# Patient Record
Sex: MALE | Race: WHITE
[De-identification: names, ages, dates, MRNs, and addresses within clinical notes are randomized per-mention and may not be internally consistent; named-entity substitution may affect disease eponyms.]

---

## 2020-07-02 ENCOUNTER — HOSPITAL ENCOUNTER (EMERGENCY)
Dept: HOSPITAL 9 - MADERS | Age: 75
LOS: 1 days | Discharge: ANOTHER HOSPITAL | End: 2020-07-03
Payer: MEDICARE

## 2020-07-02 DIAGNOSIS — I10: ICD-10-CM

## 2020-07-02 DIAGNOSIS — Z79.899: ICD-10-CM

## 2020-07-02 DIAGNOSIS — J18.9: Primary | ICD-10-CM

## 2020-07-02 LAB
ALBUMIN SERPL BCG-MCNC: 3.9 G/DL (ref 3.4–4.8)
ALP SERPL-CCNC: 86 U/L (ref 40–110)
ALT SERPL W P-5'-P-CCNC: 41 U/L (ref 8–55)
ANION GAP SERPL CALC-SCNC: 19 MMOL/L (ref 10–20)
AST SERPL-CCNC: 42 U/L (ref 5–34)
BILIRUB SERPL-MCNC: 1.2 MG/DL (ref 0.2–1.2)
BUN SERPL-MCNC: 23 MG/DL (ref 8.4–25.7)
CALCIUM SERPL-MCNC: 8.9 MG/DL (ref 7.8–10.44)
CHLORIDE SERPL-SCNC: 96 MMOL/L (ref 98–107)
CK MB SERPL-MCNC: 1.5 NG/ML (ref 0–6.6)
CO2 SERPL-SCNC: 23 MMOL/L (ref 23–31)
CREAT CL PREDICTED SERPL C-G-VRATE: 0 ML/MIN (ref 70–130)
GLOBULIN SER CALC-MCNC: 3.8 G/DL (ref 2.4–3.5)
GLUCOSE SERPL-MCNC: 117 MG/DL (ref 83–110)
HGB BLD-MCNC: 14.7 G/DL (ref 14–18)
MCH RBC QN AUTO: 28.6 PG (ref 27–31)
MCV RBC AUTO: 90.8 FL (ref 78–98)
MDIFF COMPLETE?: YES
PLATELET # BLD AUTO: 185 THOU/UL (ref 130–400)
POTASSIUM SERPL-SCNC: 3.9 MMOL/L (ref 3.5–5.1)
RBC # BLD AUTO: 5.13 MILL/UL (ref 4.7–6.1)
SODIUM SERPL-SCNC: 134 MMOL/L (ref 136–145)
WBC # BLD AUTO: 10.7 THOU/UL (ref 4.8–10.8)

## 2020-07-02 PROCEDURE — 87040 BLOOD CULTURE FOR BACTERIA: CPT

## 2020-07-02 PROCEDURE — 85025 COMPLETE CBC W/AUTO DIFF WBC: CPT

## 2020-07-02 PROCEDURE — 83880 ASSAY OF NATRIURETIC PEPTIDE: CPT

## 2020-07-02 PROCEDURE — 84484 ASSAY OF TROPONIN QUANT: CPT

## 2020-07-02 PROCEDURE — 96365 THER/PROPH/DIAG IV INF INIT: CPT

## 2020-07-02 PROCEDURE — 94760 N-INVAS EAR/PLS OXIMETRY 1: CPT

## 2020-07-02 PROCEDURE — 80053 COMPREHEN METABOLIC PANEL: CPT

## 2020-07-02 PROCEDURE — 83605 ASSAY OF LACTIC ACID: CPT

## 2020-07-02 PROCEDURE — 71275 CT ANGIOGRAPHY CHEST: CPT

## 2020-07-02 PROCEDURE — 81003 URINALYSIS AUTO W/O SCOPE: CPT

## 2020-07-02 PROCEDURE — 96375 TX/PRO/DX INJ NEW DRUG ADDON: CPT

## 2020-07-02 PROCEDURE — 93005 ELECTROCARDIOGRAM TRACING: CPT

## 2020-07-02 PROCEDURE — 82553 CREATINE MB FRACTION: CPT

## 2020-07-03 ENCOUNTER — HOSPITAL ENCOUNTER (INPATIENT)
Dept: HOSPITAL 92 - ERS | Age: 75
LOS: 7 days | Discharge: HOME HEALTH | DRG: 299 | End: 2020-07-10
Attending: INTERNAL MEDICINE | Admitting: INTERNAL MEDICINE
Payer: MEDICARE

## 2020-07-03 VITALS — BODY MASS INDEX: 42.7 KG/M2

## 2020-07-03 DIAGNOSIS — I10: ICD-10-CM

## 2020-07-03 DIAGNOSIS — I87.2: ICD-10-CM

## 2020-07-03 DIAGNOSIS — Z90.49: ICD-10-CM

## 2020-07-03 DIAGNOSIS — I27.81: ICD-10-CM

## 2020-07-03 DIAGNOSIS — G47.33: ICD-10-CM

## 2020-07-03 DIAGNOSIS — I89.0: ICD-10-CM

## 2020-07-03 DIAGNOSIS — I26.99: ICD-10-CM

## 2020-07-03 DIAGNOSIS — Z91.19: ICD-10-CM

## 2020-07-03 DIAGNOSIS — I24.8: ICD-10-CM

## 2020-07-03 DIAGNOSIS — G25.81: ICD-10-CM

## 2020-07-03 DIAGNOSIS — I28.8: ICD-10-CM

## 2020-07-03 DIAGNOSIS — I82.411: Primary | ICD-10-CM

## 2020-07-03 DIAGNOSIS — E87.70: ICD-10-CM

## 2020-07-03 DIAGNOSIS — Z20.828: ICD-10-CM

## 2020-07-03 LAB
ALBUMIN SERPL BCG-MCNC: 3.7 G/DL (ref 3.4–4.8)
ALP SERPL-CCNC: 82 U/L (ref 40–110)
ALT SERPL W P-5'-P-CCNC: 36 U/L (ref 8–55)
ANION GAP SERPL CALC-SCNC: 13 MMOL/L (ref 10–20)
ANION GAP SERPL CALC-SCNC: 13 MMOL/L (ref 10–20)
AST SERPL-CCNC: 36 U/L (ref 5–34)
BASOPHILS # BLD AUTO: 0 THOU/UL (ref 0–0.2)
BASOPHILS NFR BLD AUTO: 0.3 % (ref 0–1)
BILIRUB SERPL-MCNC: 1.4 MG/DL (ref 0.2–1.2)
BUN SERPL-MCNC: 19 MG/DL (ref 8.4–25.7)
BUN SERPL-MCNC: 20 MG/DL (ref 8.4–25.7)
CALCIUM SERPL-MCNC: 8.5 MG/DL (ref 7.8–10.44)
CALCIUM SERPL-MCNC: 9.1 MG/DL (ref 7.8–10.44)
CHLORIDE SERPL-SCNC: 96 MMOL/L (ref 98–107)
CHLORIDE SERPL-SCNC: 96 MMOL/L (ref 98–107)
CO2 SERPL-SCNC: 27 MMOL/L (ref 23–31)
CO2 SERPL-SCNC: 27 MMOL/L (ref 23–31)
CREAT CL PREDICTED SERPL C-G-VRATE: 124 ML/MIN (ref 70–130)
CREAT CL PREDICTED SERPL C-G-VRATE: 126 ML/MIN (ref 70–130)
EOSINOPHIL # BLD AUTO: 0 THOU/UL (ref 0–0.7)
EOSINOPHIL NFR BLD AUTO: 0.4 % (ref 0–10)
GLOBULIN SER CALC-MCNC: 2.9 G/DL (ref 2.4–3.5)
GLUCOSE SERPL-MCNC: 103 MG/DL (ref 83–110)
GLUCOSE SERPL-MCNC: 113 MG/DL (ref 83–110)
HGB BLD-MCNC: 14.5 G/DL (ref 14–18)
HGB BLD-MCNC: 14.5 G/DL (ref 14–18)
LYMPHOCYTES # BLD: 1.6 THOU/UL (ref 1.2–3.4)
LYMPHOCYTES NFR BLD AUTO: 17.6 % (ref 21–51)
MCH RBC QN AUTO: 30.6 PG (ref 27–31)
MCH RBC QN AUTO: 31.5 PG (ref 27–31)
MCV RBC AUTO: 90.7 FL (ref 78–98)
MCV RBC AUTO: 91.5 FL (ref 78–98)
MDIFF COMPLETE?: YES
MONOCYTES # BLD AUTO: 1.4 THOU/UL (ref 0.11–0.59)
MONOCYTES NFR BLD AUTO: 14.7 % (ref 0–10)
NEUTROPHILS # BLD AUTO: 6.1 THOU/UL (ref 1.4–6.5)
NEUTROPHILS NFR BLD AUTO: 67 % (ref 42–75)
PLATELET # BLD AUTO: 138 THOU/UL (ref 130–400)
PLATELET # BLD AUTO: 149 THOU/UL (ref 130–400)
POTASSIUM SERPL-SCNC: 3.4 MMOL/L (ref 3.5–5.1)
POTASSIUM SERPL-SCNC: 3.5 MMOL/L (ref 3.5–5.1)
RBC # BLD AUTO: 4.61 MILL/UL (ref 4.7–6.1)
RBC # BLD AUTO: 4.75 MILL/UL (ref 4.7–6.1)
SODIUM SERPL-SCNC: 132 MMOL/L (ref 136–145)
SODIUM SERPL-SCNC: 133 MMOL/L (ref 136–145)
TROPONIN I SERPL DL<=0.01 NG/ML-MCNC: 0.05 NG/ML (ref ?–0.03)
TROPONIN I SERPL DL<=0.01 NG/ML-MCNC: 0.05 NG/ML (ref ?–0.03)
WBC # BLD AUTO: 9.2 THOU/UL (ref 4.8–10.8)
WBC # BLD AUTO: 9.2 THOU/UL (ref 4.8–10.8)

## 2020-07-03 PROCEDURE — 94660 CPAP INITIATION&MGMT: CPT

## 2020-07-03 PROCEDURE — 85025 COMPLETE CBC W/AUTO DIFF WBC: CPT

## 2020-07-03 PROCEDURE — 96372 THER/PROPH/DIAG INJ SC/IM: CPT

## 2020-07-03 PROCEDURE — 87635 SARS-COV-2 COVID-19 AMP PRB: CPT

## 2020-07-03 PROCEDURE — 87040 BLOOD CULTURE FOR BACTERIA: CPT

## 2020-07-03 PROCEDURE — 85027 COMPLETE CBC AUTOMATED: CPT

## 2020-07-03 PROCEDURE — 82550 ASSAY OF CK (CPK): CPT

## 2020-07-03 PROCEDURE — 80048 BASIC METABOLIC PNL TOTAL CA: CPT

## 2020-07-03 PROCEDURE — 93005 ELECTROCARDIOGRAM TRACING: CPT

## 2020-07-03 PROCEDURE — 84484 ASSAY OF TROPONIN QUANT: CPT

## 2020-07-03 PROCEDURE — G0378 HOSPITAL OBSERVATION PER HR: HCPCS

## 2020-07-03 PROCEDURE — 93010 ELECTROCARDIOGRAM REPORT: CPT

## 2020-07-03 PROCEDURE — 82805 BLOOD GASES W/O2 SATURATION: CPT

## 2020-07-03 PROCEDURE — 36415 COLL VENOUS BLD VENIPUNCTURE: CPT

## 2020-07-03 PROCEDURE — U0003 INFECTIOUS AGENT DETECTION BY NUCLEIC ACID (DNA OR RNA); SEVERE ACUTE RESPIRATORY SYNDROME CORONAVIRUS 2 (SARS-COV-2) (CORONAVIRUS DISEASE [COVID-19]), AMPLIFIED PROBE TECHNIQUE, MAKING USE OF HIGH THROUGHPUT TECHNOLOGIES AS DESCRIBED BY CMS-2020-01-R: HCPCS

## 2020-07-03 PROCEDURE — 80053 COMPREHEN METABOLIC PANEL: CPT

## 2020-07-03 PROCEDURE — 87205 SMEAR GRAM STAIN: CPT

## 2020-07-03 PROCEDURE — 87633 RESP VIRUS 12-25 TARGETS: CPT

## 2020-07-03 PROCEDURE — 82553 CREATINE MB FRACTION: CPT

## 2020-07-03 PROCEDURE — 85007 BL SMEAR W/DIFF WBC COUNT: CPT

## 2020-07-03 PROCEDURE — 99285 EMERGENCY DEPT VISIT HI MDM: CPT

## 2020-07-03 PROCEDURE — 87070 CULTURE OTHR SPECIMN AEROBIC: CPT

## 2020-07-03 PROCEDURE — 80202 ASSAY OF VANCOMYCIN: CPT

## 2020-07-03 PROCEDURE — 84145 PROCALCITONIN (PCT): CPT

## 2020-07-03 PROCEDURE — 85018 HEMOGLOBIN: CPT

## 2020-07-03 PROCEDURE — 93970 EXTREMITY STUDY: CPT

## 2020-07-03 PROCEDURE — 71045 X-RAY EXAM CHEST 1 VIEW: CPT

## 2020-07-03 PROCEDURE — 93306 TTE W/DOPPLER COMPLETE: CPT

## 2020-07-03 PROCEDURE — 83735 ASSAY OF MAGNESIUM: CPT

## 2020-07-03 PROCEDURE — 81001 URINALYSIS AUTO W/SCOPE: CPT

## 2020-07-03 PROCEDURE — 85014 HEMATOCRIT: CPT

## 2020-07-03 RX ADMIN — CEFTRIAXONE SCH MLS: 1 INJECTION, POWDER, FOR SOLUTION INTRAMUSCULAR; INTRAVENOUS at 20:17

## 2020-07-03 RX ADMIN — ONDANSETRON PRN MG: 2 INJECTION INTRAMUSCULAR; INTRAVENOUS at 10:42

## 2020-07-03 RX ADMIN — AZITHROMYCIN SCH MLS: 500 INJECTION, POWDER, LYOPHILIZED, FOR SOLUTION INTRAVENOUS at 20:16

## 2020-07-03 NOTE — CT
CTA Angio Chest W WO Con



History: Dyspnea fever and chills



Comparison: None.



Findings: CT angiogram of the chest performed after the intravenous administration of contrast. 3-D r
endering provided.



No proximal segmental pulmonary arterial filling defect. Extensive motion artifact.



Heart size mildly enlarged. Small paratracheal lymph nodes.



Limited evaluation of the upper abdomen is relatively unremarkable.



Mild atelectasis within the lung bases. Subtle focus of consolidation anterior segment left upper lob
e. Sternum and manubrium are intact. Thoracic spine is intact.



No acute displaced rib fracture.



Impression: 

1. No pulmonary embolism.

2. Subtle focus of consolidation anterior segment left upper lobe suggesting pneumonia..

3. Mild cardiomegaly.



Reported By: Bradley Bey 

Electronically Signed:  7/3/2020 12:00 AM

## 2020-07-03 NOTE — PDOC.HHP
Hospitalist HPI





- History of Present Illness


fever cough dyspnea


History of Present Illness: 


Case of an 75y/o male with pmhx of htn restless leg syndrome and lypmhedema who 

comes to hospital due to cough dyspnea and fever. patient refer he was on his 

usual state of health until 1week ago when he started with dyspnea cough non 

productive and fever. he stated he visited [facility] and got tested for covid 

but was told results will take atleast a week. symptoms kept progressing so 

patient decided to come for evaluation. at the ed patient was dx with pneumonia 

by ct and elevated troponins for which hospitalist was called for further 

evaluation and mangement. patient denies any chest pain palpitations or 

diaphoresis


 





Hospitalist ROS





- Review of Systems


All other systems reviewed; all pertinent +/- noted in HPI/Subj





Hospitalist History





- Past Medical History


Source: patient, old records


Cardiac: reports: HTN





- Past Surgical History


Past Surgical History: reports: Appendectomy





- Family History


Family History: reports: no pertinent history





- Social History


Smoking Status: Never smoker


Alcohol: reports: Occassional


Drugs: reports: none


Living Situation: With Family





- Exam


General Appearance: NAD, awake alert


Eye: PERRL, anicteric sclera


ENT: normocephalic atraumatic, no oropharyngeal lesions


Neck: supple, symmetric, no JVD, no thyromegaly


Heart: RRR, no murmur, no gallops, no rubs


Respiratory: CTAB, no wheezes, no rales, no ronchi


Gastrointestinal: soft, non-tender, non-distended, normal bowel sounds


Extremities - other findings: b/l LE lymphedema


Neurological: cranial nerve grossly intact, normal sensation to touch


Musculoskeletal: normal tone, normal strength, no muscle wasting


Psychiatric: normal affect, normal behavior, A&O x 3





Hospitalist Results





- Radiology Interpretation


  ** CT scan - chest


Status: report reviewed by me


Additional Comment: 





Impression: 


1. No pulmonary embolism.


2. Subtle focus of consolidation anterior segment left upper lobe suggesting 

pneumonia..


3. Mild cardiomegaly.








Hospitalist H&P A/P





- Problem


(1) Pneumonia


Code(s): J18.9 - PNEUMONIA, UNSPECIFIED ORGANISM   Status: Acute   





(2) HTN (hypertension)


Code(s): I10 - ESSENTIAL (PRIMARY) HYPERTENSION   Status: Acute   





(3) Lymphedema


Code(s): I89.0 - LYMPHEDEMA, NOT ELSEWHERE CLASSIFIED   Status: Acute   





(4) Restless leg syndrome


Status: Acute   





(5) Elevated troponin


Code(s): R79.89 - OTHER SPECIFIED ABNORMAL FINDINGS OF BLOOD CHEMISTRY   Status

: Acute   





(6) COVID-19


Code(s): U07.1 - COVID-19   Status: Acute   





- Plan


Plan: 


pneumonia


- started on rocephin and azithromycin


- f/u blood + sputum cultures


-o2 supplementation


- no leukocytosis but bandemia present


- LA 1.2


- cta consistent with left upper lobe pnuemonia





covid 19


- tested at the ed, pending results


- 02 supplemenation


- islolation protocol





htn / restless leg syndrome


- continue home meds

## 2020-07-04 RX ADMIN — AZITHROMYCIN SCH MLS: 500 INJECTION, POWDER, LYOPHILIZED, FOR SOLUTION INTRAVENOUS at 22:04

## 2020-07-04 RX ADMIN — ONDANSETRON PRN MG: 2 INJECTION INTRAMUSCULAR; INTRAVENOUS at 06:14

## 2020-07-04 RX ADMIN — HYDROCODONE BITARTRATE AND ACETAMINOPHEN PRN TAB: 5; 325 TABLET ORAL at 17:32

## 2020-07-04 RX ADMIN — Medication SCH: at 08:32

## 2020-07-04 RX ADMIN — Medication SCH ML: at 22:36

## 2020-07-04 RX ADMIN — CEFTRIAXONE SCH MLS: 1 INJECTION, POWDER, FOR SOLUTION INTRAMUSCULAR; INTRAVENOUS at 20:12

## 2020-07-04 RX ADMIN — HYDROCODONE BITARTRATE AND ACETAMINOPHEN PRN TAB: 5; 325 TABLET ORAL at 22:05

## 2020-07-04 NOTE — ULT
EXAM:

Bilateral lower extremity venous Doppler



HISTORY:

bilateral lower extremity edema



FINDINGS:

Grayscale, color-flow, Doppler evaluation, spectral analysis of the bilateral lower extremity venous 
structures is performed with 2-D imaging. The bilateral common femoral, superficial femoral,

popliteal, posterior tibial, proximal greater saphenous and profunda femoral veins are imaged.



There is incompletely lumen compressibility involving the distal right lower extremity superficial fe
moral vein. Normal flow is present within this vein, and findings are suggestive of nonocclusive

DVT.



The mid posterior tibial veins are unable to be visualized bilaterally secondary to prominent subcuta
neous edema seen bilaterally.



There is otherwise normal luminal compressibility, flow, and augmentation in the visualized deep veno
us structures of the bilateral lower extremities.



There is bilateral lower extremity edema visualized greater on the right.



IMPRESSION:



1. Partially occlusive DVT involving the distal right lower extremity superficial femoral vein.

2. Right lower extremity edema.

3. Above findings were discussed with Kate, the patient's nurse by Abi, the ultrasonographer 
at the time of this examination on 7/4/2020 at 1753 hours.



Reported By: Guy Pulido 

Electronically Signed:  7/4/2020 6:13 PM

## 2020-07-04 NOTE — PDOC.HOSPP
- Subjective


Encounter Date: 07/04/20


Encounter Time: 09:00


Subjective: 





no overnight events. continues to be febrile. This morning, complains of 

persistent diffuse body aches. Cough is improving. pending repeat covid





- Objective


Vital Signs & Weight: 


 Vital Signs (12 hours)











  Temp Pulse Resp BP Pulse Ox


 


 07/04/20 05:35  101.4 F H    


 


 07/04/20 04:36  102 F H  77  18  111/56 L  95


 


 07/04/20 04:00  101.4 F H    








 Weight











Weight                         290 lb 6.4 oz














I&O: 


 











 07/03/20 07/04/20 07/05/20





 06:59 06:59 06:59


 


Intake Total  1900 


 


Output Total  400 


 


Balance  1500 











Result Diagrams: 


 07/03/20 11:49





 07/03/20 11:49





Hospitalist ROS





- Review of Systems


Constitutional: reports: fever, chills, sweats, weakness, malaise


ENT: denies: ear pain, ear discharge, nose pain, nose discharge, nose congestion

, mouth pain, mouth swelling, throat pain, throat swelling


Respiratory: reports: cough, dry.  denies: shortness of breath, hemoptysis, SOB 

with excertion, pleuritic pain, sputum


Cardiovascular: reports: edema.  denies: chest pain, palpitations, orthopnea, 

paroxysmal noc. dyspnea


Gastrointestinal: denies: nausea, vomiting, abdominal pain


Genitourinary: denies: dysuria, frequency, incontinence





- Medication


Medications: 


Active Medications











Generic Name Dose Route Start Last Admin





  Trade Name Freq  PRN Reason Stop Dose Admin


 


Acetaminophen  650 mg  07/03/20 03:29  07/04/20 04:36





  Tylenol  PO   650 mg





  Q4H PRN   Administration





  Headache/Fever/Mild Pain (1-3)   





     





     





     


 


Enoxaparin Sodium  40 mg  07/03/20 09:00  07/03/20 08:18





  Lovenox  SC   40 mg





  0900 MICHELINE   Administration





     





     





     





     


 


Sodium Chloride  1,000 mls @ 50 mls/hr  07/03/20 05:00  07/04/20 04:36





  Normal Saline 0.9%  IV   1,000 mls





  .Q20H MICHELINE   Administration





     





     





     





     


 


Azithromycin 500 mg/ Sodium  250 mls @ 250 mls/hr  07/03/20 21:00  07/03/20 20:

16





  Chloride  IVPB   250 mls





  2100 MICHELINE   Administration





     





     





     





     


 


Ceftriaxone Sodium 1 gm/  100 mls @ 200 mls/hr  07/03/20 21:00  07/03/20 20:17





  Sodium Chloride  IVPB   100 mls





  2100 MICHELINE   Administration





     





     





     





     


 


Ondansetron HCl  4 mg  07/03/20 03:29  07/04/20 06:14





  Zofran  IVP   4 mg





  Q6H PRN   Administration





  Nausea/Vomiting   





     





     





     


 


Sodium Chloride  10 ml  07/04/20 09:00  07/04/20 08:32





  Flush - Normal Saline  IVF   Not Given





  Q12HR MICHELINE   





     





     





     





     














- Exam


General Appearance: NAD, awake alert


Neck: no JVD


Heart: RRR, no murmur, no gallops, no rubs


Respiratory: CTAB, no wheezes, no rales, no ronchi


Gastrointestinal: soft, non-tender, non-distended, normal bowel sounds


Extremities: 2+ LE edema


Extremities - other findings: RLE erythema and tenderness


Psychiatric: normal affect, normal behavior, A&O x 3





Hosp A/P





- Plan


#CAP


remains febrile despite antibiotic coverage


cough improving





-procalcitonin


-viral panel


-repeat covid





#HTN


well controlled 





#troponinemia


-indeterminate, stable; likely demand ischemia due to infection


-hypervolemic





-echo





#Edema


-b/l lower extremity with signs of mild stasus dermatitis





-start low dose topical steroid


-doppler considering unresolving fever to assess DVT

## 2020-07-05 LAB
ANION GAP SERPL CALC-SCNC: 11 MMOL/L (ref 10–20)
BUN SERPL-MCNC: 17 MG/DL (ref 8.4–25.7)
CALCIUM SERPL-MCNC: 8.1 MG/DL (ref 7.8–10.44)
CHLORIDE SERPL-SCNC: 96 MMOL/L (ref 98–107)
CK MB SERPL-MCNC: 2.2 NG/ML (ref 0–6.6)
CO2 SERPL-SCNC: 27 MMOL/L (ref 23–31)
CREAT CL PREDICTED SERPL C-G-VRATE: 124 ML/MIN (ref 70–130)
GLUCOSE SERPL-MCNC: 95 MG/DL (ref 83–110)
HGB BLD-MCNC: 13.5 G/DL (ref 14–18)
POTASSIUM SERPL-SCNC: 3.5 MMOL/L (ref 3.5–5.1)
SODIUM SERPL-SCNC: 130 MMOL/L (ref 136–145)

## 2020-07-05 RX ADMIN — HYDROCODONE BITARTRATE AND ACETAMINOPHEN PRN TAB: 5; 325 TABLET ORAL at 14:30

## 2020-07-05 RX ADMIN — ONDANSETRON PRN MG: 2 INJECTION INTRAMUSCULAR; INTRAVENOUS at 09:22

## 2020-07-05 RX ADMIN — HYDROCODONE BITARTRATE AND ACETAMINOPHEN PRN TAB: 5; 325 TABLET ORAL at 18:55

## 2020-07-05 RX ADMIN — HYDROCODONE BITARTRATE AND ACETAMINOPHEN PRN TAB: 5; 325 TABLET ORAL at 02:18

## 2020-07-05 RX ADMIN — Medication SCH: at 08:12

## 2020-07-05 RX ADMIN — Medication SCH: at 21:06

## 2020-07-05 RX ADMIN — HYDROCODONE BITARTRATE AND ACETAMINOPHEN PRN TAB: 5; 325 TABLET ORAL at 08:10

## 2020-07-05 RX ADMIN — HYDROCODONE BITARTRATE AND ACETAMINOPHEN PRN TAB: 5; 325 TABLET ORAL at 22:56

## 2020-07-05 RX ADMIN — AZITHROMYCIN SCH MLS: 500 INJECTION, POWDER, LYOPHILIZED, FOR SOLUTION INTRAVENOUS at 21:04

## 2020-07-05 RX ADMIN — CEFTRIAXONE SCH MLS: 1 INJECTION, POWDER, FOR SOLUTION INTRAMUSCULAR; INTRAVENOUS at 20:05

## 2020-07-05 NOTE — PDOC.HOSPP
- Subjective


Encounter Date: 07/05/20


Encounter Time: 10:00


Subjective: 





no overnight events. this morning, continues to complain of diffuse body pain, 7

/10, persistent. 





- Objective


Vital Signs & Weight: 


 Vital Signs (12 hours)











  Temp Pulse Resp BP Pulse Ox


 


 07/05/20 12:53  99.3 F  68  24 H  148/70 H  92 L


 


 07/05/20 08:43  98.5 F  74  28 H  134/64  93 L








 Weight











Weight                         290 lb 6.4 oz














I&O: 


 











 07/04/20 07/05/20 07/06/20





 06:59 06:59 06:59


 


Intake Total 1900 1982 


 


Output Total 400 500 


 


Balance 1500 1482 











Result Diagrams: 


 07/05/20 05:55





 07/05/20 05:55





Hospitalist ROS





- Review of Systems


Constitutional: reports: weakness.  denies: fever, chills, sweats


Respiratory: denies: cough, dry, shortness of breath


Cardiovascular: denies: chest pain, palpitations, orthopnea


Gastrointestinal: reports: nausea.  denies: vomiting, abdominal pain, diarrhea


Genitourinary: denies: dysuria, frequency, hematuria





- Medication


Medications: 


Active Medications











Generic Name Dose Route Start Last Admin





  Trade Name Freq  PRN Reason Stop Dose Admin


 


Acetaminophen  650 mg  07/03/20 03:29  07/04/20 10:57





  Tylenol  PO   650 mg





  Q4H PRN   Administration





  Headache/Fever/Mild Pain (1-3)   





     





     





     


 


Hydrocodone Bitart/Acetaminophen  1 tab  07/03/20 03:29  07/05/20 08:10





  [place] 5/325  PO   1 tab





  Q4H PRN   Administration





  Moderate Pain (4-6)   





     





     





     


 


Hydrocodone Bitart/Acetaminophen  2 tab  07/03/20 03:29  07/05/20 14:30





  [place] 5/325  PO   2 tab





  Q4H PRN   Administration





  Severe Pain (7-10)   





     





     





     


 


Atorvastatin Calcium  20 mg  07/04/20 09:00  07/05/20 08:11





  Lipitor  PO   20 mg





  DAILY MICHELINE   Administration





     





     





     





     


 


Enoxaparin Sodium  120 mg  07/04/20 21:00  07/05/20 08:11





  Lovenox  SC   120 mg





  0900,2100 MICHELINE   Administration





     





     





     





     


 


Finasteride  5 mg  07/04/20 21:00  07/04/20 22:06





  Proscar  PO   5 mg





  HS MICHELINE   Administration





     





     





     





     


 


Sodium Chloride  1,000 mls @ 50 mls/hr  07/03/20 05:00  07/05/20 05:42





  Normal Saline 0.9%  IV   1,000 mls





  .Q20H MICHELINE   Administration





     





     





     





     


 


Azithromycin 500 mg/ Sodium  250 mls @ 250 mls/hr  07/03/20 21:00  07/04/20 22:

04





  Chloride  IVPB   250 mls





  2100 MICHELINE   Administration





     





     





     





     


 


Ceftriaxone Sodium 1 gm/  100 mls @ 200 mls/hr  07/03/20 21:00  07/04/20 20:12





  Sodium Chloride  IVPB   100 mls





  2100 MICHELINE   Administration





     





     





     





     


 


Metoprolol Succinate  50 mg  07/04/20 09:00  07/05/20 08:11





  Toprol Xl  PO   50 mg





  DAILY MICHELINE   Administration





     





     





     





     


 


Montelukast Sodium  10 mg  07/04/20 09:00  07/05/20 08:11





  Singulair  PO   10 mg





  DAILY MICHELINE   Administration





     





     





     





     


 


Ondansetron HCl  4 mg  07/03/20 03:29  07/05/20 09:22





  Zofran  IVP   4 mg





  Q6H PRN   Administration





  Nausea/Vomiting   





     





     





     


 


Pramipexole Dihydrochloride  1 mg  07/04/20 09:00  07/05/20 14:29





  Mirapex  PO   1 mg





  TID MICHELINE   Administration





     





     





     





     


 


Sodium Chloride  10 ml  07/04/20 09:00  07/05/20 08:12





  Flush - Normal Saline  IVF   Not Given





  Q12HR MICHELINE   





     





     





     





     














- Exam


General Appearance: NAD, awake alert


Neck: no JVD


Heart: RRR, no murmur, no gallops


Respiratory: CTAB, no wheezes, no rales, no ronchi


Gastrointestinal: soft, non-tender, non-distended


Gastrointestinal - other findings: hyperactive bowel sounds


Extremities: 2+ LE edema


Extremities - other findings: unchanged


Psychiatric: normal affect, normal behavior, A&O x 3





Hosp A/P





- Plan


#CAP


last fever 7/4


procalcitonin elevated despite treatment


-repeat covid and viral panel negative





-cotinue ceftriaxone and azithro for total of 5 days assuming clinical 

improvement





#DVT


-started lovenox





#HTN


well controlled 





#troponinemia


-indeterminate, stable; likely demand ischemia due to infection


-hypervolemic





-echo





#Edema


#stasis dermatitis


-b/l lower extremity with signs of mild stasus dermatitis


-considering dilated IVC, normal heart function, and high STOPBANG score, may 

have pulmonary hypertension due to sleep apnea resulting in b/l LE edema





-continue low dose topical steroid


-sleep study as outpatient





ELOS: 2-3 nights

## 2020-07-06 LAB
ANALYZER IN CARDIO: (no result)
ANION GAP SERPL CALC-SCNC: 12 MMOL/L (ref 10–20)
BASE EXCESS STD BLDV CALC-SCNC: 1.9 MEQ/L
BUN SERPL-MCNC: 15 MG/DL (ref 8.4–25.7)
CA-I BLDV-MCNC: 1.04 MMOL/L (ref 1.16–1.32)
CALCIUM SERPL-MCNC: 8.1 MG/DL (ref 7.8–10.44)
CHLORIDE BLDV-SCNC: 95 MMOL/L (ref 98–106)
CHLORIDE SERPL-SCNC: 99 MMOL/L (ref 98–107)
CO2 SERPL-SCNC: 25 MMOL/L (ref 23–31)
CREAT CL PREDICTED SERPL C-G-VRATE: 133 ML/MIN (ref 70–130)
GLUCOSE SERPL-MCNC: 101 MG/DL (ref 83–110)
HCO3 BLDV-SCNC: 26 MEQ/L (ref 22–28)
HCT VFR BLDV CALC: 42 % (ref 42–52)
HGB BLDV-MCNC: 14.2 G/DL (ref 12.6–17.4)
MAGNESIUM SERPL-MCNC: 2.2 MG/DL (ref 1.6–2.6)
POTASSIUM BLDV-SCNC: 3.78 MMOL/L (ref 3.7–5.3)
POTASSIUM SERPL-SCNC: 3.7 MMOL/L (ref 3.5–5.1)
SODIUM BLDV-SCNC: 130.1 MMOL/L (ref 133–146)
SODIUM SERPL-SCNC: 132 MMOL/L (ref 136–145)

## 2020-07-06 PROCEDURE — 8E0ZXY6 ISOLATION: ICD-10-PCS | Performed by: INTERNAL MEDICINE

## 2020-07-06 RX ADMIN — Medication SCH: at 08:04

## 2020-07-06 RX ADMIN — CEFTRIAXONE SCH MLS: 1 INJECTION, POWDER, FOR SOLUTION INTRAMUSCULAR; INTRAVENOUS at 20:12

## 2020-07-06 RX ADMIN — ONDANSETRON PRN MG: 2 INJECTION INTRAMUSCULAR; INTRAVENOUS at 20:13

## 2020-07-06 RX ADMIN — Medication SCH: at 20:13

## 2020-07-06 RX ADMIN — HYDROCODONE BITARTRATE AND ACETAMINOPHEN PRN TAB: 5; 325 TABLET ORAL at 21:04

## 2020-07-06 RX ADMIN — HYDROCODONE BITARTRATE AND ACETAMINOPHEN PRN TAB: 5; 325 TABLET ORAL at 11:32

## 2020-07-06 RX ADMIN — AZITHROMYCIN SCH MLS: 500 INJECTION, POWDER, LYOPHILIZED, FOR SOLUTION INTRAVENOUS at 20:13

## 2020-07-06 RX ADMIN — HYDROCODONE BITARTRATE AND ACETAMINOPHEN PRN TAB: 5; 325 TABLET ORAL at 04:33

## 2020-07-06 NOTE — PDOC.HOSPP
- Subjective


Encounter Date: 07/06/20


Encounter Time: 09:00


Subjective: 





no overnight events. this morning, endorses using CPAP with a few short breaks 

and feeling much better this morning. Has no complaints. 





- Objective


Vital Signs & Weight: 


 Vital Signs (12 hours)











  Temp Pulse Resp BP BP BP Pulse Ox


 


 07/06/20 08:00  98.4 F  72  20  150/77 H  150/77 H   93 L


 


 07/06/20 04:45      156/72 H  


 


 07/06/20 04:00  98.5 F  86  20     92 L


 


 07/06/20 00:44   66  22 H     95


 


 07/06/20 00:00  99.3 F  60  20    136/65  95








 Weight











Weight                         290 lb 6.4 oz














I&O: 


 











 07/05/20 07/06/20 07/07/20





 06:59 06:59 06:59


 


Intake Total 1982 2166 


 


Output Total 500 450 


 


Balance 1482 1716 











Result Diagrams: 


 07/05/20 05:55





 07/06/20 05:39





Hospitalist ROS





- Review of Systems


Constitutional: denies: fever, chills, sweats


Respiratory: denies: cough, dry, shortness of breath, hemoptysis, SOB with 

excertion, pleuritic pain


Cardiovascular: denies: chest pain, palpitations, orthopnea, paroxysmal noc. 

dyspnea


Gastrointestinal: denies: nausea, vomiting, abdominal pain, diarrhea


Genitourinary: denies: dysuria, frequency, incontinence, hematuria





- Medication


Medications: 


Active Medications











Generic Name Dose Route Start Last Admin





  Trade Name Freq  PRN Reason Stop Dose Admin


 


Acetaminophen  650 mg  07/03/20 03:29  07/04/20 10:57





  Tylenol  PO   650 mg





  Q4H PRN   Administration





  Headache/Fever/Mild Pain (1-3)   





     





     





     


 


Hydrocodone Bitart/Acetaminophen  1 tab  07/03/20 03:29  07/05/20 08:10





  [place] 5/325  PO   1 tab





  Q4H PRN   Administration





  Moderate Pain (4-6)   





     





     





     


 


Hydrocodone Bitart/Acetaminophen  2 tab  07/03/20 03:29  07/06/20 04:33





  [place] 5/325  PO   2 tab





  Q4H PRN   Administration





  Severe Pain (7-10)   





     





     





     


 


Atorvastatin Calcium  20 mg  07/04/20 09:00  07/06/20 07:58





  Lipitor  PO   20 mg





  DAILY MICHELINE   Administration





     





     





     





     


 


Enoxaparin Sodium  120 mg  07/04/20 21:00  07/06/20 08:01





  Lovenox  SC   120 mg





  0900,2100 MICHELINE   Administration





     





     





     





     


 


Finasteride  5 mg  07/04/20 21:00  07/05/20 21:04





  Proscar  PO   5 mg





  HS MICHELINE   Administration





     





     





     





     


 


Sodium Chloride  1,000 mls @ 50 mls/hr  07/03/20 05:00  07/06/20 08:10





  Normal Saline 0.9%  IV   1,000 mls





  .Q20H MICHELINE   Administration





     





     





     





     


 


Azithromycin 500 mg/ Sodium  250 mls @ 250 mls/hr  07/03/20 21:00  07/05/20 21:

04





  Chloride  IVPB   250 mls





  2100 MICHELINE   Administration





     





     





     





     


 


Ceftriaxone Sodium 1 gm/  100 mls @ 200 mls/hr  07/03/20 21:00  07/05/20 20:05





  Sodium Chloride  IVPB   100 mls





  2100 MICHELINE   Administration





     





     





     





     


 


Metoprolol Succinate  50 mg  07/04/20 09:00  07/06/20 07:58





  Toprol Xl  PO   50 mg





  DAILY MICHELINE   Administration





     





     





     





     


 


Montelukast Sodium  10 mg  07/04/20 09:00  07/06/20 07:58





  Singulair  PO   10 mg





  DAILY MICHELINE   Administration





     





     





     





     


 


Ondansetron HCl  4 mg  07/03/20 03:29  07/05/20 09:22





  Zofran  IVP   4 mg





  Q6H PRN   Administration





  Nausea/Vomiting   





     





     





     


 


Pramipexole Dihydrochloride  1 mg  07/04/20 09:00  07/06/20 07:58





  Mirapex  PO   1 mg





  TID MICHELINE   Administration





     





     





     





     


 


Sodium Chloride  10 ml  07/04/20 09:00  07/06/20 08:04





  Flush - Normal Saline  IVF   Not Given





  Q12HR MICHELINE   





     





     





     





     














- Exam


General Appearance: NAD, awake alert


General - other findings: sitting comfortably on bed


Eye: PERRL


Neck: no JVD


Heart: RRR, no murmur, no gallops, no rubs


Respiratory: CTAB, no wheezes, no rales, no ronchi


Gastrointestinal: soft, non-tender, non-distended, normal bowel sounds


Extremities: 2+ LE edema


Extremities - other findings: unchanged


Psychiatric: normal affect, normal behavior, A&O x 3





Hosp A/P





- Plan


#CAP


last fever 7/4


procalcitonin elevated despite treatment


-repeat covid and viral panel negative





-cotinue ceftriaxone and azithro for total of 5 days assuming clinical 

improvement





#sleep apnea


#cor pulmonale


-nonadherent because not comfortably with mask


-educated regarding improtance of treating sleeping apnea and effects on heart 

on lower extremity edema in context of cor pulmonale


-ECHO showing dilated IVC, EKG showing RBBB, may be result of cor pulmonale due 

to RADHA-induced hypoxia and pulmonary vasoconstriction





-continue CPAP HS


-CM consulted for rehab and to obtain new mask/CPAP machine





#DVT


-stop lovenox, start eliquis for total of 3 months





#HTN


conitnue to monitor 





#troponinemia


-indeterminate, stable; likely demand ischemia due to infection


-hypervolemic





#Edema


#stasis dermatitis


-b/l lower extremity with signs of mild stasus dermatitis





-continue low dose topical steroid





ELOS: 1 night

## 2020-07-06 NOTE — EKG
Test Reason : 

Blood Pressure : ***/*** mmHG

Vent. Rate : 071 BPM     Atrial Rate : 071 BPM

   P-R Int : 178 ms          QRS Dur : 156 ms

    QT Int : 438 ms       P-R-T Axes : 056 -58 063 degrees

   QTc Int : 475 ms

 

Normal sinus rhythm

Right bundle branch block

Left anterior fascicular block

*** Bifascicular block ***

Septal infarct , age undetermined

Abnormal ECG

No previous ECGs available

Confirmed by CAROL KRAUSE (2) on 7/6/2020 2:27:57 PM

 

Referred By:  YVAN           Confirmed By:CAROL KRAUSE

## 2020-07-07 LAB
PROT UR STRIP.AUTO-MCNC: 30 MG/DL
SP GR UR STRIP: 1.02 (ref 1–1.04)
WBC UR QL AUTO: (no result) HPF (ref 0–3)

## 2020-07-07 RX ADMIN — HYDROCODONE BITARTRATE AND ACETAMINOPHEN PRN TAB: 5; 325 TABLET ORAL at 02:41

## 2020-07-07 RX ADMIN — AZITHROMYCIN SCH MLS: 500 INJECTION, POWDER, LYOPHILIZED, FOR SOLUTION INTRAVENOUS at 20:43

## 2020-07-07 RX ADMIN — Medication SCH: at 08:08

## 2020-07-07 RX ADMIN — Medication SCH ML: at 20:44

## 2020-07-07 NOTE — PDOC.HOSPP
- Subjective


Encounter Date: 07/07/20


Encounter Time: 09:00


Subjective: 





overnight, febrile 101.5. this morning, claims to feel better but in early 

afternoon diaphoretic, tachypnic. CXR reported as cannot exclude b/l 

infiltrates. Repeating covid STONE, septic route initiated.





- Objective


Vital Signs & Weight: 


 Vital Signs (12 hours)











  Temp Pulse Resp BP BP Pulse Ox


 


 07/07/20 11:38  102.2 F H  80  16  151/66 H   95


 


 07/07/20 08:00       92 L


 


 07/07/20 07:17  98.4 F  68  18  147/71 H   92 L


 


 07/07/20 04:00  98.0 F  69  20   135/69  93 L


 


 07/07/20 00:50   65  20    94 L








 Weight











Weight                         290 lb 6.4 oz














I&O: 


 











 07/06/20 07/07/20 07/08/20





 06:59 06:59 06:59


 


Intake Total 2166 2490 


 


Output Total 450 200 


 


Balance 1716 2290 











Result Diagrams: 


 07/05/20 05:55





 07/06/20 05:39





Hospitalist ROS





- Review of Systems


Constitutional: denies: fever, chills, sweats


Respiratory: denies: cough, dry, shortness of breath, pleuritic pain, sputum


Cardiovascular: denies: chest pain, palpitations, orthopnea, paroxysmal noc. 

dyspnea


Gastrointestinal: denies: nausea, vomiting, abdominal pain, diarrhea


Genitourinary: denies: dysuria, frequency, incontinence, hematuria





- Medication


Medications: 


Active Medications











Generic Name Dose Route Start Last Admin





  Trade Name Freq  PRN Reason Stop Dose Admin


 


Acetaminophen  650 mg  07/03/20 03:29  07/06/20 20:13





  Tylenol  PO   650 mg





  Q4H PRN   Administration





  Headache/Fever/Mild Pain (1-3)   





     





     





     


 


Hydrocodone Bitart/Acetaminophen  1 tab  07/03/20 03:29  07/06/20 21:04





  [place] 5/325  PO   1 tab





  Q4H PRN   Administration





  Moderate Pain (4-6)   





     





     





     


 


Hydrocodone Bitart/Acetaminophen  2 tab  07/03/20 03:29  07/07/20 02:41





  [place] 5/325  PO   2 tab





  Q4H PRN   Administration





  Severe Pain (7-10)   





     





     





     


 


Apixaban  10 mg  07/06/20 21:00  07/07/20 08:07





  Eliquis  PO   10 mg





  BID MICHELINE   Administration





     





     





     





     


 


Atorvastatin Calcium  20 mg  07/04/20 09:00  07/07/20 08:07





  Lipitor  PO   20 mg





  DAILY MICHELINE   Administration





     





     





     





     


 


Finasteride  5 mg  07/04/20 21:00  07/06/20 20:13





  Proscar  PO   5 mg





  HS MICHELINE   Administration





     





     





     





     


 


Azithromycin 500 mg/ Sodium  250 mls @ 250 mls/hr  07/03/20 21:00  07/06/20 20:

13





  Chloride  IVPB   250 mls





  2100 MICHELINE   Administration





     





     





     





     


 


Metoprolol Succinate  50 mg  07/04/20 09:00  07/07/20 08:07





  Toprol Xl  PO   50 mg





  DAILY MICHELINE   Administration





     





     





     





     


 


Montelukast Sodium  10 mg  07/04/20 09:00  07/07/20 08:07





  Singulair  PO   10 mg





  DAILY MICHELINE   Administration





     





     





     





     


 


Ondansetron HCl  4 mg  07/03/20 03:29  07/06/20 20:13





  Zofran  IVP   4 mg





  Q6H PRN   Administration





  Nausea/Vomiting   





     





     





     


 


Pramipexole Dihydrochloride  1 mg  07/04/20 09:00  07/07/20 08:07





  Mirapex  PO   1 mg





  TID MICHELINE   Administration





     





     





     





     


 


Sodium Chloride  10 ml  07/04/20 09:00  07/07/20 08:08





  Flush - Normal Saline  IVF   Not Given





  Q12HR MICHELINE   





     





     





     





     














- Exam


General Appearance: awake alert, ill appearing


General - other findings: drowsy


Eye: PERRL


ENT: moist mucosa


Neck: supple, symmetric, no JVD, no lymphadenopathy


Heart: RRR, no murmur, no gallops


Respiratory: CTAB, no wheezes, no rales, tachypneic


Gastrointestinal: soft, non-tender, normal bowel sounds


Extremities: 2+ LE edema


Extremities - other findings: unchanged


Skin - other findings: no pressure ulcers


Psychiatric: normal affect, normal behavior, A&O x 3





Hosp A/P





- Plan


#Recurrent fevers


-qSOFA 2/3


-30cc/kg fluids, escalated ABx, infectoius workup, rule out fever





#sleep apnea


#cor pulmonale





-continue CPAP HS


-CM consulted for rehab and to obtain new mask/CPAP machine





#DVT


-continue eliquis





#HTN


conitnue to monitor 





#troponinemia


-indeterminate, stable; likely demand ischemia due to infection


-hypervolemic





#Edema


#stasis dermatitis


-b/l lower extremity with signs of mild stasus dermatitis





-continue low dose topical steroid





ELOS: 1 night

## 2020-07-07 NOTE — RAD
XR Chest 1 View



HISTORY: Fever



COMPARISON: None



FINDINGS: The heart size is normal. There are mild patchy opacities in the lung fields bilaterally. N
o pneumothoraces or pleural effusions are seen. There are degenerative changes in the spine..



IMPRESSION: Possibility of bilateral pneumonia should be considered.



Reported By: Ivan Melendez 

Electronically Signed:  7/7/2020 11:21 AM

## 2020-07-08 LAB — VANCOMYCIN TROUGH SERPL-MCNC: 11.7 UG/ML

## 2020-07-08 RX ADMIN — Medication SCH: at 19:48

## 2020-07-08 RX ADMIN — Medication SCH ML: at 08:34

## 2020-07-08 RX ADMIN — HYDROCODONE BITARTRATE AND ACETAMINOPHEN PRN TAB: 5; 325 TABLET ORAL at 09:50

## 2020-07-08 RX ADMIN — HYDROCODONE BITARTRATE AND ACETAMINOPHEN PRN TAB: 5; 325 TABLET ORAL at 19:49

## 2020-07-08 RX ADMIN — HYDROCODONE BITARTRATE AND ACETAMINOPHEN PRN TAB: 5; 325 TABLET ORAL at 00:04

## 2020-07-08 NOTE — PDOC.HOSPP
- Subjective


Encounter Date: 07/08/20


Encounter Time: 09:00


Subjective: 


no overnight events. this morning, feels better overall, exertional dyspnea 

persists. No overnight fevers, chills, sweats, cough.





- Objective


Vital Signs & Weight: 


 Vital Signs (12 hours)











  Temp Pulse Resp BP Pulse Ox


 


 07/08/20 08:00  98.8 F  70  20  158/82 H  93 L


 


 07/08/20 04:45  97.8 F  62  18  153/66 H  95








 Weight











Weight                         290 lb 6.4 oz














I&O: 


 











 07/07/20 07/08/20 07/09/20





 06:59 06:59 06:59


 


Intake Total 2490 4925 


 


Output Total 200 850 


 


Balance 2290 4075 











Result Diagrams: 


 07/05/20 05:55





 07/06/20 05:39





Hospitalist ROS





- Review of Systems


Constitutional: denies: fever, chills, sweats, weakness, malaise, other


Respiratory: denies: cough, dry, shortness of breath, hemoptysis, SOB with 

excertion, pleuritic pain, sputum, wheezing, other


Cardiovascular: denies: chest pain, palpitations, orthopnea, paroxysmal noc. 

dyspnea, edema, light headedness, other


Gastrointestinal: denies: nausea, vomiting, abdominal pain, diarrhea, 

constipation, melena, hematochezia, other


Genitourinary: denies: dysuria, frequency, incontinence, hematuria, retention, 

other





- Medication


Medications: 


Active Medications











Generic Name Dose Route Start Last Admin





  Trade Name Freq  PRN Reason Stop Dose Admin


 


Acetaminophen  650 mg  07/03/20 03:29  07/07/20 13:25





  Tylenol  PO   650 mg





  Q4H PRN   Administration





  Headache/Fever/Mild Pain (1-3)   





     





     





     


 


Hydrocodone Bitart/Acetaminophen  1 tab  07/03/20 03:29  07/06/20 21:04





  [place] 5/325  PO   1 tab





  Q4H PRN   Administration





  Moderate Pain (4-6)   





     





     





     


 


Hydrocodone Bitart/Acetaminophen  2 tab  07/03/20 03:29  07/08/20 09:50





  [place] 5/325  PO   2 tab





  Q4H PRN   Administration





  Severe Pain (7-10)   





     





     





     


 


Apixaban  10 mg  07/06/20 21:00  07/08/20 08:34





  Eliquis  PO   10 mg





  BID MICHELINE   Administration





     





     





     





     


 


Atorvastatin Calcium  20 mg  07/04/20 09:00  07/08/20 08:34





  Lipitor  PO   20 mg





  DAILY MICHELINE   Administration





     





     





     





     


 


Finasteride  5 mg  07/04/20 21:00  07/07/20 20:43





  Proscar  PO   5 mg





  HS MICHELINE   Administration





     





     





     





     


 


Azithromycin 500 mg/ Sodium  250 mls @ 250 mls/hr  07/03/20 21:00  07/07/20 20:

43





  Chloride  IVPB   250 mls





  2100 MICHELINE   Administration





     





     





     





     


 


Cefepime HCl 1 gm/ Sodium  100 mls @ 200 mls/hr  07/07/20 21:00  07/08/20 08:34





  Chloride  IVPB   100 mls





  Q12HR MICHELINE   Administration





     





     





     





     


 


Vancomycin HCl 1.75 gm/ Sodium  500 mls @ 250 mls/hr  07/08/20 02:00  07/08/20 

09:54





  Chloride  IVPB   500 mls





  0200,1000,1800 MICHELINE   Administration





     





     





     





     


 


Metoprolol Succinate  50 mg  07/04/20 09:00  07/08/20 08:34





  Toprol Xl  PO   50 mg





  DAILY MICHELINE   Administration





     





     





     





     


 


Montelukast Sodium  10 mg  07/04/20 09:00  07/08/20 08:34





  Singulair  PO   10 mg





  DAILY MICHELINE   Administration





     





     





     





     


 


Ondansetron HCl  4 mg  07/03/20 03:29  07/06/20 20:13





  Zofran  IVP   4 mg





  Q6H PRN   Administration





  Nausea/Vomiting   





     





     





     


 


Pramipexole Dihydrochloride  1 mg  07/04/20 09:00  07/08/20 08:34





  Mirapex  PO   1 mg





  TID MICHELINE   Administration





     





     





     





     


 


Sodium Chloride  10 ml  07/04/20 09:00  07/08/20 08:34





  Flush - Normal Saline  IVF   10 ml





  Q12HR MICHELINE   Administration





     





     





     





     














- Exam


General Appearance: NAD, awake alert


Eye: PERRL, anicteric sclera


ENT: normocephalic atraumatic


Neck: no JVD


Heart: RRR, no murmur, no gallops, no rubs


Respiratory: CTAB, no wheezes, no rales, no ronchi


Gastrointestinal: soft, non-tender, non-distended, normal bowel sounds


Extremities: 2+ LE edema


Extremities - other findings: unchanaged


Psychiatric: normal affect, normal behavior, A&O x 3





Hosp A/P





- Plan


#Recurrent fevers


-qSOFA 0/3


-continue ABx pending infectious workup finalization





#sleep apnea


#cor pulmonale





-continue CPAP 


- consulted for rehab and to obtain new mask/CPAP machine





#DVT


-continue eliquis





#HTN


conitnue to monitor 





#troponinemia


-indeterminate, stable; likely demand ischemia/right heart strain due to 

infection/possible pulm embolism/untreated RADHA


-hypervolemic





#Edema


#stasis dermatitis


-b/l lower extremity with signs of mild stasus dermatitis





-continue low dose topical steroid





ELOS: 3 night

## 2020-07-08 NOTE — PDOC.EVN
Event Note





- Event Note


Event Note: 





Notified by RN, patient with increased abdominal distention today, and 

excessive belching.  Last bowel movement yesterday, states full and soft bowel 

movement. 


Feels its gas-like pain.  Also concern over regurgitation of food immediately 

after eating since this morning.  


Will keep NPO and consult speech to assess for risk of aspiration. 


KUB to assess for obstruction vs. ileus.


If no obstruction, will give simethicone. 


Continue to monitor.

## 2020-07-09 LAB
ANION GAP SERPL CALC-SCNC: 17 MMOL/L (ref 10–20)
BASOPHILS # BLD AUTO: 0 THOU/UL (ref 0–0.2)
BASOPHILS NFR BLD AUTO: 0 % (ref 0–1)
BUN SERPL-MCNC: 5 MG/DL (ref 8.4–25.7)
CALCIUM SERPL-MCNC: 8.3 MG/DL (ref 7.8–10.44)
CHLORIDE SERPL-SCNC: 104 MMOL/L (ref 98–107)
CO2 SERPL-SCNC: 15 MMOL/L (ref 23–31)
CREAT CL PREDICTED SERPL C-G-VRATE: 144 ML/MIN (ref 70–130)
EOSINOPHIL # BLD AUTO: 0.1 THOU/UL (ref 0–0.7)
EOSINOPHIL NFR BLD AUTO: 1.8 % (ref 0–10)
GLUCOSE SERPL-MCNC: 105 MG/DL (ref 83–110)
HGB BLD-MCNC: 14.8 G/DL (ref 14–18)
LYMPHOCYTES # BLD: 1.2 THOU/UL (ref 1.2–3.4)
LYMPHOCYTES NFR BLD AUTO: 14.1 % (ref 21–51)
MAGNESIUM SERPL-MCNC: 2.1 MG/DL (ref 1.6–2.6)
MCH RBC QN AUTO: 32.1 PG (ref 27–31)
MCV RBC AUTO: 94.1 FL (ref 78–98)
MDIFF COMPLETE?: YES
MONOCYTES # BLD AUTO: 0.8 THOU/UL (ref 0.11–0.59)
MONOCYTES NFR BLD AUTO: 10 % (ref 0–10)
NEUTROPHILS # BLD AUTO: 6.2 THOU/UL (ref 1.4–6.5)
NEUTROPHILS NFR BLD AUTO: 74.1 % (ref 42–75)
PLATELET # BLD AUTO: 119 THOU/UL (ref 130–400)
POLYCHROMASIA BLD QL SMEAR: (no result) (100X)
POTASSIUM SERPL-SCNC: 4.6 MMOL/L (ref 3.5–5.1)
RBC # BLD AUTO: 4.59 MILL/UL (ref 4.7–6.1)
SODIUM SERPL-SCNC: 131 MMOL/L (ref 136–145)
VANCOMYCIN TROUGH SERPL-MCNC: 29.3 UG/ML
WBC # BLD AUTO: 8.4 THOU/UL (ref 4.8–10.8)

## 2020-07-09 RX ADMIN — HYDROCODONE BITARTRATE AND ACETAMINOPHEN PRN TAB: 5; 325 TABLET ORAL at 22:44

## 2020-07-09 RX ADMIN — HYDROCODONE BITARTRATE AND ACETAMINOPHEN PRN TAB: 5; 325 TABLET ORAL at 08:07

## 2020-07-09 RX ADMIN — Medication SCH ML: at 08:06

## 2020-07-09 RX ADMIN — ONDANSETRON PRN MG: 2 INJECTION INTRAMUSCULAR; INTRAVENOUS at 15:41

## 2020-07-09 RX ADMIN — Medication SCH ML: at 22:44

## 2020-07-09 NOTE — PDOC.HOSPP
- Subjective


Encounter Date: 07/09/20


Encounter Time: 10:00


Subjective: 





no overnight events. repeat infectious workup negative. remains afebrile. 

likely due to DVT/PE. stopped antibiotics pending placement





- Objective


Vital Signs & Weight: 


 Vital Signs (12 hours)











  Temp Pulse Resp BP BP BP Pulse Ox


 


 07/09/20 20:00  98.9 F  79  20    153/80 H  95


 


 07/09/20 18:35  98.9 F  75  22 H   163/78 H   99


 


 07/09/20 18:20      178/72 H  


 


 07/09/20 16:00  98.6 F  92  20    157/74 H  96


 


 07/09/20 15:49        94 L


 


 07/09/20 15:34   82   189/85 H   


 


 07/09/20 15:00   82    198/97 H  


 


 07/09/20 14:24     192/84 H   


 


 07/09/20 13:32  98.3 F  65  20    161/76 H  93 L








 Weight











Weight                         290 lb 6.4 oz














I&O: 


 











 07/08/20 07/09/20 07/10/20





 06:59 06:59 06:59


 


Intake Total 4925 2340 1400


 


Output Total 850 675 820


 


Balance 4075 1663 580











Result Diagrams: 


 07/09/20 06:15





 07/09/20 08:30





Hospitalist ROS





- Review of Systems


Constitutional: denies: fever, chills, sweats, weakness, malaise, other


Respiratory: denies: cough, dry, shortness of breath, hemoptysis, SOB with 

excertion, pleuritic pain, sputum, wheezing, other


Cardiovascular: denies: chest pain, palpitations, orthopnea, paroxysmal noc. 

dyspnea, edema, light headedness, other


Gastrointestinal: denies: nausea, vomiting, abdominal pain, diarrhea, 

constipation, melena, hematochezia, other





- Medication


Medications: 


Active Medications











Generic Name Dose Route Start Last Admin





  Trade Name Freq  PRN Reason Stop Dose Admin


 


Acetaminophen  650 mg  07/03/20 03:29  07/07/20 13:25





  Tylenol  PO   650 mg





  Q4H PRN   Administration





  Headache/Fever/Mild Pain (1-3)   





     





     





     


 


Hydrocodone Bitart/Acetaminophen  1 tab  07/03/20 03:29  07/09/20 08:07





  [place] 5/325  PO   1 tab





  Q4H PRN   Administration





  Moderate Pain (4-6)   





     





     





     


 


Hydrocodone Bitart/Acetaminophen  2 tab  07/03/20 03:29  07/08/20 19:49





  [place] 5/325  PO   2 tab





  Q4H PRN   Administration





  Severe Pain (7-10)   





     





     





     


 


Apixaban  10 mg  07/06/20 21:00  07/09/20 08:03





  Eliquis  PO   10 mg





  BID MICHELINE   Administration





     





     





     





     


 


Atorvastatin Calcium  20 mg  07/04/20 09:00  07/09/20 08:04





  Lipitor  PO   20 mg





  DAILY MICHELINE   Administration





     





     





     





     


 


Finasteride  5 mg  07/04/20 21:00  07/08/20 19:48





  Proscar  PO   5 mg





  HS MICHELINE   Administration





     





     





     





     


 


Labetalol HCl  10 mg  07/09/20 14:58  07/09/20 15:34





  Normodyne  SLOW IVP   10 mg





  Q4H PRN   Administration





  SBP Greater Than 180   





     





     





     


 


Metoprolol Succinate  50 mg  07/04/20 09:00  07/09/20 08:04





  Toprol Xl  PO   50 mg





  DAILY MICHELINE   Administration





     





     





     





     


 


Montelukast Sodium  10 mg  07/04/20 09:00  07/09/20 08:04





  Singulair  PO   10 mg





  DAILY MICHELINE   Administration





     





     





     





     


 


Ondansetron HCl  4 mg  07/03/20 03:29  07/09/20 15:41





  Zofran  IVP   4 mg





  Q6H PRN   Administration





  Nausea/Vomiting   





     





     





     


 


Pramipexole Dihydrochloride  1 mg  07/04/20 09:00  07/09/20 14:24





  Mirapex  PO   1 mg





  TID MICHELINE   Administration





     





     





     





     


 


Sodium Chloride  10 ml  07/04/20 09:00  07/09/20 08:06





  Flush - Normal Saline  IVF   10 ml





  Q12HR MICHELINE   Administration





     





     





     





     














- Exam


General Appearance: NAD, awake alert


Neck: no JVD


Heart: RRR, no murmur, no gallops, no rubs


Respiratory: CTAB, no wheezes, no rales, no ronchi


Gastrointestinal: soft, non-tender, non-distended, normal bowel sounds


Extremities: 2+ LE edema


Psychiatric: normal affect, normal behavior, A&O x 3





Hosp A/P





- Plan


#Recurrent fevers


-qSOFA 0/3


-repeat infectious workup -ve


-likely due to DVT/PE





#sleep apnea


#cor pulmonale





-continue CPAP HS


-CM consulted for rehab and to obtain new mask/CPAP machine





#DVT


-continue eliquis





#HTN


-poorly controlled, modified regimen





#troponinemia


-indeterminate, stable; likely demand ischemia/right heart strain due to 

infection/possible pulm embolism/untreated RADHA


-hypervolemic





#Edema


#stasis dermatitis


-b/l lower extremity with signs of mild stasus dermatitis





-continue low dose topical steroid





ELOS: 1-2 night

## 2020-07-10 VITALS — SYSTOLIC BLOOD PRESSURE: 162 MMHG | DIASTOLIC BLOOD PRESSURE: 78 MMHG | TEMPERATURE: 98.3 F

## 2020-07-10 LAB
ANION GAP SERPL CALC-SCNC: 17 MMOL/L (ref 10–20)
BUN SERPL-MCNC: 11 MG/DL (ref 8.4–25.7)
CALCIUM SERPL-MCNC: 7.7 MG/DL (ref 7.8–10.44)
CHLORIDE SERPL-SCNC: 103 MMOL/L (ref 98–107)
CO2 SERPL-SCNC: 17 MMOL/L (ref 23–31)
CREAT CL PREDICTED SERPL C-G-VRATE: 130 ML/MIN (ref 70–130)
GLUCOSE SERPL-MCNC: 89 MG/DL (ref 83–110)
POTASSIUM SERPL-SCNC: 5.1 MMOL/L (ref 3.5–5.1)
SODIUM SERPL-SCNC: 132 MMOL/L (ref 136–145)

## 2020-07-10 RX ADMIN — Medication SCH ML: at 09:10

## 2020-07-12 NOTE — DIS
DATE OF ADMISSION:  07/06/2020



DATE OF DISCHARGE:  07/10/2020



HOSPITAL COURSE:  Mr. Krueger is a 74-year-old male with a medical history of

hypertension, RADHA, chronic venous insufficiency, with acute dyspnea and fever.  He

was diagnosed with community-acquired pneumonia.  COVID was ruled out three times

after the patient had recurrent fevers as an inpatient.  Repeat complete infectious

workup was negative, after which lower extremity Doppler was carried out and the

patient was found to have a DVT.  The patient was started on anticoagulation.

During his inpatient stay, the patient endorsed nonadherence to his CPAP machine

because the mask was not comfortable.  He was educated regarding the importance of

adherence to his CPAP, and was requested to discuss replacing his home CPAP with

another machine or mask with his primary care physician.  He was discharged home

hemodynamically stable, breathing and saturating well on 2 L nasal cannula, which is

his baseline, and also refusing placement in rehab because there was no bed

availability until next week.  He was discharged with home health and home physical

therapy. 



PHYSICAL EXAMINATION:

VITAL SIGNS:  Blood pressure 158/72, pulse 65, respiratory rate 20, oxygen

saturation 97% on 2 L nasal cannula, and temperature 98.4.  The patient has been

afebrile for more than 48 hours prior to discharge. 

GENERAL APPEARANCE:  Lying comfortably in bed.  Morbidly obese. 

NECK:  No JVD. 

HEART:  Regular rate and rhythm.  No murmurs, gallops, or rubs. 

RESPIRATORY:  Clear to auscultation bilaterally.  No wheezing, rales, or rhonchi. 

GI:  Soft, nontender, and nondistended.  Normal bowel sounds. 

EXTREMITIES: +2 edema bilateral up to thigh level with signs of status dermatitis,

which has improved following topical steroids as an inpatient. 

PSYCH: Proper mood and affect.  Alert and oriented x3.



MEDICATIONS LIST:  New medications;

1. Eliquis 10 mg b.i.d. for five more days, then 5 mg b.i.d. for a total of three

months for DVT. 

2. Tylenol Extra Strength 650 q.4 hours p.r.n. pain.

3. Chlorthalidone 25 mg p.o. daily for poorly controlled hypertension.

4. Lisinopril 20 mg daily for poorly controlled hypertension.

5. Levofloxacin 750 mg daily for three more days to complete treatment for

community-acquired pneumonia for a total of 7 days after initial treatment did not

result in resolution of fevers. 



Discontinued medications, no discontinued medications. Modified medications, no

modified medications. 



Continued medications;

1. Atorvastatin.

2. Finasteride.

3. Metoprolol succinate.

4. Montelukast.

5. Pramipexole.

6. Lasix 40 mg daily.







Job ID:  [id number]

## 2020-07-13 NOTE — PQF
CLINICAL DOCUMENTATION IMPROVEMENT CLARIFICATION FORM:  ICD-10 Updated

PLEASE DO AN ADDENDUM TO THE PROGRESS NOTE WITH ANY DOCUMENTATION UPDATES OR 
ADDITIONS AND CARRY THROUGH TO DC SUMMARY.   THANK YOU.



DATE:     7/13/2020                                 ATTN: Dr. Ulrich



Please exercise your independent, professional judgement in responding to the 
clarification form.  clinical indicators are provided on the bottom of this 
form for your review.



[  ] Acute Cor Pulmonale

[ x ] Chronic Cor Pulmonale

[  ] Pulmonary Hypertension

[  ] Does not apply to this patient

[  ] Unable to determine

[  ] Other diagnosis: _____________________________________ 



For continuity of documentation, please document condition throughout progress 
notes and discharge summary.  Thank you.



CLINICAL INDICATORS - SIGNS / SYMPTOMS/LABS  /  RESULTS AND LOCATION IN EMR



7/6 ([facility])  Exam extremities:  2+ LE edema

               A/P:  sleep apnea

                        cor pulmonale

                       -Echo showing dilated IVC, EKG showing RBBB, may be 
result of cor pulmonale due 

                        to RADHA-induced hypoxia and pulmonary vasoconstriction



7/9 ([facility]) A/P: repeat infectious workup - ve

                              likely due to DVT/PE

                              sleep apnea 

                              cor pulmonale



RISKS:

7/5 ([facility]): DVT. HTN.  Edema.

7/7 ([facility]) sleep apnea. cor pulmonale

7/9 ([facility]) repeat infectious workup - ve

                        likely due to DVT/PE



TREATMENT:

Order 7/4-7/6: Lovenox 120mg SC 0900,2100

7/6 ([facility]) -educated regarding importance of treating sleeping apnea & 
effects on heart on LE edema

in context of cor pulmonale.  -continue CPAP HS

__________________________________________________________



Thank you,

July

(This form is maintained as a part of the permanent medical record)

 2015 [employer].  All Rights Reserved

July Malik RN, BSN    [email or web address]    Cell Phone: [phone]



[facility]